# Patient Record
Sex: FEMALE | URBAN - METROPOLITAN AREA
[De-identification: names, ages, dates, MRNs, and addresses within clinical notes are randomized per-mention and may not be internally consistent; named-entity substitution may affect disease eponyms.]

---

## 2021-05-10 ENCOUNTER — APPOINTMENT (RX ONLY)
Dept: URBAN - METROPOLITAN AREA CLINIC 117 | Facility: CLINIC | Age: 76
Setting detail: DERMATOLOGY
End: 2021-05-10

## 2021-05-10 VITALS — TEMPERATURE: 98.3 F

## 2021-05-10 DIAGNOSIS — Z41.1 ENCOUNTER FOR COSMETIC SURGERY: ICD-10-CM

## 2021-05-10 PROCEDURE — ? FILLERS

## 2021-05-10 PROCEDURE — ? CONSULTATION - AGING FACE

## 2021-05-10 PROCEDURE — ? COSMETIC QUOTE

## 2021-05-10 ASSESSMENT — LOCATION ZONE DERM
LOCATION ZONE: FACE
LOCATION ZONE: LIP

## 2021-05-10 ASSESSMENT — LOCATION SIMPLE DESCRIPTION DERM
LOCATION SIMPLE: LEFT LIP
LOCATION SIMPLE: RIGHT CHEEK

## 2021-05-10 ASSESSMENT — LOCATION DETAILED DESCRIPTION DERM
LOCATION DETAILED: RIGHT MEDIAL BUCCAL CHEEK
LOCATION DETAILED: LEFT LOWER CUTANEOUS LIP

## 2021-05-10 NOTE — PROCEDURE: FILLERS
Lateral Face Filler Volume In Cc: 0
Topical Anesthesia?: no
Detail Level: Detailed
Consent: Written consent obtained. Risks include but not limited to bruising, beading, irregular texture, ulceration, infection, allergic reaction, scar formation, incomplete augmentation, temporary nature, procedural pain.
Administered By (Optional): Long Holguin MD
Lot #: [id number]
Expiration Date (Month Year): 03/19/22
Filler: Juvederm Ultra Plus XC
Map Statment: See attached map for complete details

## 2021-05-10 NOTE — PROCEDURE: COSMETIC QUOTE
Face Procedure 1 Price/Unit (In Dollars- Use Only Numbers And Decimals): [address]
Laser 1 Free Text Discount (In Dollars- Use Only Numbers And Decimals): 0.00
Misc 6 Percentage Discount: 0
Apply Anesthesia Fee: Use Anesthesia 1
Facility 1: 3.25 Hours
Implant 2: Silicone inspira
Implant 4 Price/Unit (In Dollars- Use Only Numbers And Decimals): [address]
Implant 3 Price/Unit (In Dollars- Use Only Numbers And Decimals): [address]
Breast Procedure 4: Breast Implant exchange with Mastopexy
Implant 4: Silicone cohesive
Breast Procedure 2: Breast Augmentation-Saline
Apply Facility Fee: Use Facility 1
Breast Procedure 2 Price/Unit (In Dollars- Use Only Numbers And Decimals): [address] [employer]
Face Procedure 4: Lower Blephroplasty
Implant 3: Silicone soft touch
Use Name For Above Discounts: No
Anesthesia Fee Units (Optional): 1
Breast Procedure 3: Breast Implant Exchange
Body Procedure 2: Abdominoplasty with Liposuction
Face Procedure 1: Face Lift
Body Procedure 1: Abdominoplasty
Body Procedure 4: Liposuction of abdomen
Face Procedure 2: Upper/lower Blepharoplasty
Breast Procedure 1: Breast Augmentation-Silicone
Implant 2 Price/Unit (In Dollars- Use Only Numbers And Decimals): [address]
Body Procedure 3: Liposuction of abdomen and flanks
Implant 1: saline
Anesthesia 1: 3 hours
Anesthesia 1 Price/Unit (In Dollars- Use Only Numbers And Decimals): 9330.00
Breast Procedure 1 Price/Unit (In Dollars- Use Only Numbers And Decimals): 3939
Facility 2: [facility]
Face Procedure 3: Upper Blephroplasty
Facility 1 Price (In Dollars- Use Only Numbers And Decimals): 1350.00
Detail Level: Zone
Facility 2 Price (In Dollars- Use Only Numbers And Decimals): [address] [facility],[address]
Breast Procedure 7: Breast Reduction
Implant 1 Price/Unit (In Dollars- Use Only Numbers And Decimals): [employer]
Breast Procedure 5: Breast Augmentation with Mastopexy
Breast Procedure 6: Breast Mastopexy

## 2021-05-14 ENCOUNTER — APPOINTMENT (RX ONLY)
Dept: URBAN - METROPOLITAN AREA CLINIC 117 | Facility: CLINIC | Age: 76
Setting detail: DERMATOLOGY
End: 2021-05-14

## 2021-05-14 VITALS — TEMPERATURE: 97.8 F

## 2021-05-14 DIAGNOSIS — Z41.1 ENCOUNTER FOR COSMETIC SURGERY: ICD-10-CM

## 2021-05-14 PROCEDURE — ? FILLERS

## 2021-05-14 ASSESSMENT — LOCATION SIMPLE DESCRIPTION DERM
LOCATION SIMPLE: LEFT CHEEK
LOCATION SIMPLE: LEFT LIP
LOCATION SIMPLE: RIGHT LIP
LOCATION SIMPLE: RIGHT CHEEK

## 2021-05-14 ASSESSMENT — LOCATION ZONE DERM
LOCATION ZONE: LIP
LOCATION ZONE: FACE

## 2021-05-14 ASSESSMENT — LOCATION DETAILED DESCRIPTION DERM
LOCATION DETAILED: RIGHT CENTRAL BUCCAL CHEEK
LOCATION DETAILED: LEFT UPPER CUTANEOUS LIP
LOCATION DETAILED: RIGHT SUPERIOR MEDIAL BUCCAL CHEEK
LOCATION DETAILED: LEFT SUPERIOR MEDIAL BUCCAL CHEEK
LOCATION DETAILED: LEFT MEDIAL BUCCAL CHEEK
LOCATION DETAILED: RIGHT UPPER CUTANEOUS LIP

## 2021-05-14 NOTE — PROCEDURE: FILLERS
Lateral Face Filler Volume In Cc: 0
Topical Anesthesia?: no
Administered By (Optional): Izabela Guerin MD
Filler: Juvederm Ultra Plus XC
Detail Level: Detailed
Lot #: [id number]
Consent: Written consent obtained. Risks include but not limited to bruising, beading, irregular texture, ulceration, infection, allergic reaction, scar formation, incomplete augmentation, temporary nature, procedural pain.
Expiration Date (Month Year): 03/27/22
Map Statment: See attached map for complete details

## 2021-05-17 ENCOUNTER — APPOINTMENT (RX ONLY)
Dept: URBAN - METROPOLITAN AREA CLINIC 117 | Facility: CLINIC | Age: 76
Setting detail: DERMATOLOGY
End: 2021-05-17

## 2021-05-17 PROCEDURE — ? COSMETIC QUOTE

## 2021-05-17 NOTE — PROCEDURE: COSMETIC QUOTE
Show Monthly Cost Breakdown: No
Face Procedure 15 Free Text Discount (In Dollars- Use Only Numbers And Decimals): 0.00
Face Procedure 4 Units: 0
Face Procedure 1: Face Lift
Face Procedure 5 Price/Unit (In Dollars- Use Only Numbers And Decimals): 3000.00
Facility 2 Price (In Dollars- Use Only Numbers And Decimals): [address] [facility],[address]
Face Procedure 2: Upper/lower Blepharoplasty
Breast Procedure 5: Breast Augmentation with Mastopexy
Additional Note (Will Following Above Verbiage): In office procedure.  1.5 hrs
Breast Procedure 2 Price/Unit (In Dollars- Use Only Numbers And Decimals): 1200 [employer]
Quote Title (Optional- Will Act As A Header): Fat Transfer to Face from Abdomen
Breast Procedure 6: Breast Mastopexy
Implant 4: Silicone cohesive
Face Procedure 3: Upper Blephroplasty
Body Procedure 3: Liposuction of abdomen and flanks
Implant 4 Price/Unit (In Dollars- Use Only Numbers And Decimals): [address]
Face Procedure 5: In office Fat Transfer
Anesthesia 1: per hour
Face Procedure 5 Units: 1
Anesthesia 1 Price/Unit (In Dollars- Use Only Numbers And Decimals): [employer]
Detail Level: Zone
Facility 1 Price (In Dollars- Use Only Numbers And Decimals): 600
Implant 2: Silicone inspira
Facility 1: [facility]
Breast Procedure 7: Breast Reduction
Breast Procedure 2: Breast Augmentation-Saline
Implant 2 Price/Unit (In Dollars- Use Only Numbers And Decimals): [address]
Body Procedure 2: Abdominoplasty with Liposuction
Breast Procedure 1 Price/Unit (In Dollars- Use Only Numbers And Decimals): 5028
Implant 3 Price/Unit (In Dollars- Use Only Numbers And Decimals): [address] [facility]
Implant 1 Price/Unit (In Dollars- Use Only Numbers And Decimals): 750
Breast Procedure 3: Breast Implant Exchange
Facility 2: [facility]
Breast Procedure 4: Breast Implant exchange with Mastopexy
Body Procedure 1: Abdominoplasty
Face Procedure 4: Lower Blephroplasty
Implant 3: Silicone soft touch
Breast Procedure 1: Breast Augmentation-Silicone
Implant 1: saline
Body Procedure 4: Liposuction of abdomen